# Patient Record
Sex: FEMALE | Race: WHITE | NOT HISPANIC OR LATINO | URBAN - METROPOLITAN AREA
[De-identification: names, ages, dates, MRNs, and addresses within clinical notes are randomized per-mention and may not be internally consistent; named-entity substitution may affect disease eponyms.]

---

## 2017-03-20 ENCOUNTER — NEW PATIENT (OUTPATIENT)
Dept: URBAN - METROPOLITAN AREA CLINIC 10 | Facility: CLINIC | Age: 72
End: 2017-03-20
Payer: MEDICARE

## 2017-03-20 DIAGNOSIS — H00.011 HORDEOLUM, RIGHT UPPER LID: ICD-10-CM

## 2017-03-20 PROCEDURE — 92015 DETERMINE REFRACTIVE STATE: CPT | Performed by: OPTOMETRIST

## 2017-03-20 PROCEDURE — 92004 COMPRE OPH EXAM NEW PT 1/>: CPT | Performed by: OPTOMETRIST

## 2017-03-20 PROCEDURE — 92134 CPTRZ OPH DX IMG PST SGM RTA: CPT | Performed by: OPTOMETRIST

## 2017-03-20 ASSESSMENT — KERATOMETRY
OS: 43.75
OD: 43.25

## 2017-03-20 ASSESSMENT — VISUAL ACUITY
OS: 20/20
OD: 20/80

## 2017-03-20 ASSESSMENT — INTRAOCULAR PRESSURE
OD: 20
OS: 20

## 2017-05-23 ENCOUNTER — FOLLOW UP ESTABLISHED (OUTPATIENT)
Dept: URBAN - METROPOLITAN AREA CLINIC 10 | Facility: CLINIC | Age: 72
End: 2017-05-23
Payer: MEDICARE

## 2017-05-23 PROCEDURE — 92083 EXTENDED VISUAL FIELD XM: CPT | Performed by: OPTOMETRIST

## 2017-05-23 PROCEDURE — 92133 CPTRZD OPH DX IMG PST SGM ON: CPT | Performed by: OPTOMETRIST

## 2017-05-23 PROCEDURE — 92012 INTRM OPH EXAM EST PATIENT: CPT | Performed by: OPTOMETRIST

## 2017-05-23 PROCEDURE — 76514 ECHO EXAM OF EYE THICKNESS: CPT | Performed by: OPTOMETRIST

## 2017-05-23 ASSESSMENT — INTRAOCULAR PRESSURE
OS: 21
OD: 20

## 2018-06-12 ENCOUNTER — NEW PATIENT (OUTPATIENT)
Dept: URBAN - METROPOLITAN AREA CLINIC 10 | Facility: CLINIC | Age: 73
End: 2018-06-12
Payer: MEDICARE

## 2018-06-12 DIAGNOSIS — H25.13 AGE-RELATED NUCLEAR CATARACT, BILATERAL: ICD-10-CM

## 2018-06-12 PROCEDURE — 92014 COMPRE OPH EXAM EST PT 1/>: CPT | Performed by: OPTOMETRIST

## 2018-06-12 ASSESSMENT — KERATOMETRY
OS: 43.75
OD: 44.25

## 2018-06-12 ASSESSMENT — VISUAL ACUITY
OD: 20/80
OS: 20/20

## 2018-06-12 ASSESSMENT — INTRAOCULAR PRESSURE
OD: 24
OS: 22

## 2019-07-18 ENCOUNTER — FOLLOW UP ESTABLISHED (OUTPATIENT)
Dept: URBAN - METROPOLITAN AREA CLINIC 10 | Facility: CLINIC | Age: 74
End: 2019-07-18
Payer: MEDICARE

## 2019-07-18 DIAGNOSIS — H40.013 OPEN ANGLE WITH BORDERLINE FINDINGS, LOW RISK, BILATERAL: Primary | ICD-10-CM

## 2019-07-18 DIAGNOSIS — H52.223 REGULAR ASTIGMATISM, BILATERAL: ICD-10-CM

## 2019-07-18 PROCEDURE — 92133 CPTRZD OPH DX IMG PST SGM ON: CPT | Performed by: OPTOMETRIST

## 2019-07-18 PROCEDURE — 92015 DETERMINE REFRACTIVE STATE: CPT | Performed by: OPTOMETRIST

## 2019-07-18 PROCEDURE — 92014 COMPRE OPH EXAM EST PT 1/>: CPT | Performed by: OPTOMETRIST

## 2019-07-18 PROCEDURE — 92083 EXTENDED VISUAL FIELD XM: CPT | Performed by: OPTOMETRIST

## 2019-07-18 ASSESSMENT — KERATOMETRY
OD: 43.63
OS: 43.63

## 2019-07-18 ASSESSMENT — VISUAL ACUITY
OD: 20/80
OS: 20/30

## 2019-07-18 ASSESSMENT — INTRAOCULAR PRESSURE
OS: 24
OD: 24

## 2019-09-24 ENCOUNTER — RX CHECK (OUTPATIENT)
Dept: URBAN - METROPOLITAN AREA CLINIC 10 | Facility: CLINIC | Age: 74
End: 2019-09-24
Payer: MEDICARE

## 2019-09-24 PROCEDURE — 92012 INTRM OPH EXAM EST PATIENT: CPT | Performed by: OPTOMETRIST

## 2019-09-24 ASSESSMENT — VISUAL ACUITY
OD: 20/80
OS: 20/40

## 2019-09-24 ASSESSMENT — INTRAOCULAR PRESSURE
OD: 22
OS: 22

## 2019-09-24 ASSESSMENT — KERATOMETRY
OD: 43.38
OS: 43.50

## 2019-10-02 ENCOUNTER — Encounter (OUTPATIENT)
Dept: URBAN - METROPOLITAN AREA CLINIC 10 | Facility: CLINIC | Age: 74
End: 2019-10-02
Payer: MEDICARE

## 2019-10-02 DIAGNOSIS — Z01.818 ENCOUNTER FOR OTHER PREPROCEDURAL EXAMINATION: Primary | ICD-10-CM

## 2019-10-02 PROCEDURE — 92025 CPTRIZED CORNEAL TOPOGRAPHY: CPT | Performed by: OPHTHALMOLOGY

## 2019-10-02 PROCEDURE — 99213 OFFICE O/P EST LOW 20 MIN: CPT | Performed by: PHYSICIAN ASSISTANT

## 2019-10-08 ENCOUNTER — FOLLOW UP ESTABLISHED (OUTPATIENT)
Dept: URBAN - METROPOLITAN AREA CLINIC 10 | Facility: CLINIC | Age: 74
End: 2019-10-08
Payer: MEDICARE

## 2019-10-08 DIAGNOSIS — H17.11 CENTRAL CORNEAL OPACITY, RIGHT EYE: ICD-10-CM

## 2019-10-08 DIAGNOSIS — H52.222 REGULAR ASTIGMATISM, LEFT EYE: ICD-10-CM

## 2019-10-08 DIAGNOSIS — H53.2 DIPLOPIA: ICD-10-CM

## 2019-10-08 DIAGNOSIS — H52.211 IRREGULAR ASTIGMATISM, RIGHT EYE: ICD-10-CM

## 2019-10-08 PROCEDURE — 92004 COMPRE OPH EXAM NEW PT 1/>: CPT | Performed by: OPHTHALMOLOGY

## 2019-10-08 PROCEDURE — 76519 ECHO EXAM OF EYE: CPT | Performed by: OPHTHALMOLOGY

## 2019-10-08 ASSESSMENT — INTRAOCULAR PRESSURE
OD: 23
OS: 23

## 2019-10-15 ENCOUNTER — SURGERY (OUTPATIENT)
Dept: URBAN - METROPOLITAN AREA SURGERY 5 | Facility: SURGERY | Age: 74
End: 2019-10-15
Payer: MEDICARE

## 2019-10-15 ENCOUNTER — POST OP (OUTPATIENT)
Dept: URBAN - METROPOLITAN AREA CLINIC 10 | Facility: CLINIC | Age: 74
End: 2019-10-15

## 2019-10-15 DIAGNOSIS — Z96.1 PRESENCE OF INTRAOCULAR LENS: Primary | ICD-10-CM

## 2019-10-15 PROCEDURE — 66984 XCAPSL CTRC RMVL W/O ECP: CPT | Performed by: OPHTHALMOLOGY

## 2019-10-15 PROCEDURE — 99024 POSTOP FOLLOW-UP VISIT: CPT | Performed by: OPTOMETRIST

## 2019-10-15 ASSESSMENT — INTRAOCULAR PRESSURE
OD: 42
OD: 47
OD: 9
OD: 42
OD: 9
OD: 47

## 2019-10-16 ENCOUNTER — POST OP (OUTPATIENT)
Dept: URBAN - METROPOLITAN AREA CLINIC 10 | Facility: CLINIC | Age: 74
End: 2019-10-16

## 2019-10-16 PROCEDURE — 99024 POSTOP FOLLOW-UP VISIT: CPT | Performed by: OPTOMETRIST

## 2019-10-16 RX ORDER — BRIMONIDINE TARTRATE 2 MG/ML
0.2 % SOLUTION/ DROPS OPHTHALMIC
Qty: 0 | Refills: 0 | Status: INACTIVE
Start: 2019-10-16 | End: 2019-11-20

## 2019-10-16 ASSESSMENT — INTRAOCULAR PRESSURE: OD: 21

## 2019-10-23 ENCOUNTER — POST OP (OUTPATIENT)
Dept: URBAN - METROPOLITAN AREA CLINIC 10 | Facility: CLINIC | Age: 74
End: 2019-10-23

## 2019-10-23 PROCEDURE — 99024 POSTOP FOLLOW-UP VISIT: CPT | Performed by: OPTOMETRIST

## 2019-10-23 ASSESSMENT — VISUAL ACUITY
OS: 20/40
OD: 20/100

## 2019-10-23 ASSESSMENT — INTRAOCULAR PRESSURE
OS: 24
OD: 22

## 2019-10-29 ENCOUNTER — SURGERY (OUTPATIENT)
Dept: URBAN - METROPOLITAN AREA SURGERY 5 | Facility: SURGERY | Age: 74
End: 2019-10-29
Payer: MEDICARE

## 2019-10-29 PROCEDURE — CRBAL CREDIT BALANCE: CUSTOM | Performed by: OPHTHALMOLOGY

## 2019-10-30 ENCOUNTER — POST OP (OUTPATIENT)
Dept: URBAN - METROPOLITAN AREA CLINIC 10 | Facility: CLINIC | Age: 74
End: 2019-10-30

## 2019-10-30 PROCEDURE — 99024 POSTOP FOLLOW-UP VISIT: CPT | Performed by: OPTOMETRIST

## 2019-10-30 ASSESSMENT — INTRAOCULAR PRESSURE: OS: 20

## 2019-11-20 ENCOUNTER — POST OP (OUTPATIENT)
Dept: URBAN - METROPOLITAN AREA CLINIC 10 | Facility: CLINIC | Age: 74
End: 2019-11-20

## 2019-11-20 PROCEDURE — 99024 POSTOP FOLLOW-UP VISIT: CPT | Performed by: OPTOMETRIST

## 2019-11-20 RX ORDER — BRIMONIDINE TARTRATE 2 MG/ML
0.2 % SOLUTION/ DROPS OPHTHALMIC
Qty: 1 | Refills: 3 | Status: INACTIVE
Start: 2019-11-20 | End: 2020-11-19

## 2019-11-20 ASSESSMENT — INTRAOCULAR PRESSURE
OS: 24
OS: 21
OD: 24
OD: 21

## 2019-11-20 ASSESSMENT — VISUAL ACUITY
OS: 20/25
OD: 20/100

## 2019-12-09 ENCOUNTER — RX CHECK (OUTPATIENT)
Dept: URBAN - METROPOLITAN AREA CLINIC 10 | Facility: CLINIC | Age: 74
End: 2019-12-09
Payer: MEDICARE

## 2019-12-09 DIAGNOSIS — H10.45 OTHER CHRONIC ALLERGIC CONJUNCTIVITIS: Primary | ICD-10-CM

## 2019-12-09 PROCEDURE — 92012 INTRM OPH EXAM EST PATIENT: CPT | Performed by: OPTOMETRIST

## 2019-12-09 ASSESSMENT — VISUAL ACUITY
OS: 20/20
OD: 20/100

## 2019-12-09 ASSESSMENT — KERATOMETRY
OS: 43.63
OD: 43.63

## 2020-03-10 ENCOUNTER — Encounter (OUTPATIENT)
Dept: URBAN - METROPOLITAN AREA CLINIC 10 | Facility: CLINIC | Age: 75
End: 2020-03-10
Payer: MEDICARE

## 2020-03-10 PROCEDURE — 92083 EXTENDED VISUAL FIELD XM: CPT | Performed by: OPTOMETRIST

## 2020-06-26 ENCOUNTER — FOLLOW UP ESTABLISHED (OUTPATIENT)
Dept: URBAN - METROPOLITAN AREA CLINIC 10 | Facility: CLINIC | Age: 75
End: 2020-06-26
Payer: MEDICARE

## 2020-06-26 DIAGNOSIS — H53.011 DEPRIVATION AMBLYOPIA, RIGHT EYE: ICD-10-CM

## 2020-06-26 DIAGNOSIS — H11.31 SUBCONJUNCTIVAL HEMORRHAGE OF RIGHT EYE: Primary | ICD-10-CM

## 2020-06-26 PROCEDURE — 92012 INTRM OPH EXAM EST PATIENT: CPT | Performed by: OPTOMETRIST

## 2020-06-26 ASSESSMENT — INTRAOCULAR PRESSURE
OS: 17
OD: 16

## 2020-10-27 ENCOUNTER — FOLLOW UP ESTABLISHED (OUTPATIENT)
Dept: URBAN - METROPOLITAN AREA CLINIC 10 | Facility: CLINIC | Age: 75
End: 2020-10-27
Payer: MEDICARE

## 2020-10-27 PROCEDURE — 92014 COMPRE OPH EXAM EST PT 1/>: CPT | Performed by: OPTOMETRIST

## 2020-10-27 PROCEDURE — 92133 CPTRZD OPH DX IMG PST SGM ON: CPT | Performed by: OPTOMETRIST

## 2020-10-27 ASSESSMENT — INTRAOCULAR PRESSURE
OS: 30
OS: 24
OD: 23

## 2020-10-27 ASSESSMENT — VISUAL ACUITY
OS: 20/20
OD: 20/150

## 2020-11-19 ENCOUNTER — FOLLOW UP ESTABLISHED (OUTPATIENT)
Dept: URBAN - METROPOLITAN AREA CLINIC 10 | Facility: CLINIC | Age: 75
End: 2020-11-19
Payer: MEDICARE

## 2020-11-19 PROCEDURE — 92012 INTRM OPH EXAM EST PATIENT: CPT | Performed by: OPTOMETRIST

## 2020-11-19 RX ORDER — BIMATOPROST 0.1 MG/ML
0.01 % SOLUTION/ DROPS OPHTHALMIC
Qty: 1 | Refills: 0 | Status: INACTIVE
Start: 2020-11-19 | End: 2020-11-19

## 2020-11-19 RX ORDER — LATANOPROST 50 UG/ML
0.005 % SOLUTION OPHTHALMIC
Qty: 3 | Refills: 3 | Status: INACTIVE
Start: 2020-11-19 | End: 2021-10-26

## 2020-11-19 ASSESSMENT — INTRAOCULAR PRESSURE
OD: 20
OS: 20

## 2021-03-23 ENCOUNTER — FOLLOW UP ESTABLISHED (OUTPATIENT)
Dept: URBAN - METROPOLITAN AREA CLINIC 10 | Facility: CLINIC | Age: 76
End: 2021-03-23
Payer: OTHER MISCELLANEOUS

## 2021-03-23 PROCEDURE — 99213 OFFICE O/P EST LOW 20 MIN: CPT | Performed by: OPTOMETRIST

## 2021-03-23 ASSESSMENT — INTRAOCULAR PRESSURE
OD: 19
OS: 21

## 2021-10-26 ENCOUNTER — OFFICE VISIT (OUTPATIENT)
Dept: URBAN - METROPOLITAN AREA CLINIC 10 | Facility: CLINIC | Age: 76
End: 2021-10-26
Payer: OTHER MISCELLANEOUS

## 2021-10-26 DIAGNOSIS — H26.493 OTHER SECONDARY CATARACT, BILATERAL: ICD-10-CM

## 2021-10-26 DIAGNOSIS — H43.811 VITREOUS DEGENERATION, RIGHT EYE: ICD-10-CM

## 2021-10-26 DIAGNOSIS — H05.20 UNSPECIFIED EXOPHTHALMOS: ICD-10-CM

## 2021-10-26 PROCEDURE — 99214 OFFICE O/P EST MOD 30 MIN: CPT | Performed by: OPTOMETRIST

## 2021-10-26 RX ORDER — LATANOPROST 50 UG/ML
0.005 % SOLUTION OPHTHALMIC
Qty: 7.5 | Refills: 3 | Status: INACTIVE
Start: 2021-10-26 | End: 2021-12-07

## 2021-10-26 ASSESSMENT — VISUAL ACUITY
OS: 20/20
OD: 20/150

## 2021-10-26 ASSESSMENT — INTRAOCULAR PRESSURE
OD: 20
OS: 20

## 2021-10-26 NOTE — IMPRESSION/PLAN
Impression: Unspecified exophthalmos Plan: Pt does not report change. Prior CT of head and orbits unremarkable although right globe noted to be larger than left. Prior thyroid panel normal.
Mild exophthalmos OD. No pain on retropulsion or restriction on EOMs. BCVA is limited by amblyopia and appears stable. Monitor.

## 2021-10-26 NOTE — IMPRESSION/PLAN
Impression: Open angle with borderline findings, low risk, bilateral
--Denies fam hx
--Previously on treatment and taken off, notes received ([facility]). --Moderate IOPs, moderate c/d but stable to notes and with healthy rim tissue
--? Previous SLT, notes difficult to read --Thin pachs OD due to previous scar removal, normal OS Plan: Discs and IOPs remain stable. Photos today appear stable. Last HVF essentially clean OD, scattered inferior defects OS. Last RNFL: Superior/Inferior thinning OU early temp thin OS. Continue Latanoprost qhs OU. RTC 6 mo IOP check c possible HVF 24-2 and OCT RNFL.

## 2021-10-26 NOTE — IMPRESSION/PLAN
Impression: Other secondary cataract, bilateral: H26.493. Plan: Opacified capsule not affecting vision. Continue to monitor.
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
## 2022-05-09 ENCOUNTER — OFFICE VISIT (OUTPATIENT)
Dept: URBAN - METROPOLITAN AREA CLINIC 10 | Facility: CLINIC | Age: 77
End: 2022-05-09
Payer: OTHER MISCELLANEOUS

## 2022-05-09 DIAGNOSIS — H05.20 UNSPECIFIED EXOPHTHALMOS: ICD-10-CM

## 2022-05-09 DIAGNOSIS — H40.013 OPEN ANGLE WITH BORDERLINE FINDINGS, LOW RISK, BILATERAL: Primary | ICD-10-CM

## 2022-05-09 PROCEDURE — 92083 EXTENDED VISUAL FIELD XM: CPT | Performed by: OPTOMETRIST

## 2022-05-09 PROCEDURE — 99214 OFFICE O/P EST MOD 30 MIN: CPT | Performed by: OPTOMETRIST

## 2022-05-09 RX ORDER — LATANOPROST 50 UG/ML
0.005 % SOLUTION OPHTHALMIC
Qty: 7.5 | Refills: 3 | Status: ACTIVE
Start: 2022-05-09

## 2022-05-09 ASSESSMENT — INTRAOCULAR PRESSURE
OS: 19
OD: 17

## 2022-05-09 NOTE — IMPRESSION/PLAN
Impression: Open angle with borderline findings, low risk, bilateral
--Denies fam hx
--Previously on treatment and taken off, notes received ([facility]). --Moderate IOPs, moderate c/d but stable to notes and with healthy rim tissue
--? Previous SLT, notes difficult to read --Thin pachs OD due to previous scar removal, normal OS Plan: Discs and IOPs remain stable. HVF essentially clean OD, scattered inferior defects OS. No apparent progression. Last RNFL: Superior/Inferior thinning OU early temp thin OS. Continue Latanoprost qhs OU. RTC 6 mo CEE and possible OCT RNFL.

## 2022-11-10 ENCOUNTER — OFFICE VISIT (OUTPATIENT)
Dept: URBAN - METROPOLITAN AREA CLINIC 10 | Facility: CLINIC | Age: 77
End: 2022-11-10
Payer: OTHER MISCELLANEOUS

## 2022-11-10 DIAGNOSIS — H40.013 OPEN ANGLE WITH BORDERLINE FINDINGS, LOW RISK, BILATERAL: Primary | ICD-10-CM

## 2022-11-10 DIAGNOSIS — H05.20 UNSPECIFIED EXOPHTHALMOS: ICD-10-CM

## 2022-11-10 DIAGNOSIS — H53.011 DEPRIVATION AMBLYOPIA, RIGHT EYE: ICD-10-CM

## 2022-11-10 DIAGNOSIS — H26.493 OTHER SECONDARY CATARACT, BILATERAL: ICD-10-CM

## 2022-11-10 DIAGNOSIS — H43.811 VITREOUS DEGENERATION, RIGHT EYE: ICD-10-CM

## 2022-11-10 PROCEDURE — 92133 CPTRZD OPH DX IMG PST SGM ON: CPT | Performed by: STUDENT IN AN ORGANIZED HEALTH CARE EDUCATION/TRAINING PROGRAM

## 2022-11-10 PROCEDURE — 99214 OFFICE O/P EST MOD 30 MIN: CPT | Performed by: STUDENT IN AN ORGANIZED HEALTH CARE EDUCATION/TRAINING PROGRAM

## 2022-11-10 ASSESSMENT — VISUAL ACUITY
OD: 20/70
OS: 20/20

## 2022-11-10 ASSESSMENT — INTRAOCULAR PRESSURE
OD: 19
OS: 22

## 2022-11-10 ASSESSMENT — KERATOMETRY
OD: 43.75
OS: 43.50

## 2022-11-10 NOTE — IMPRESSION/PLAN
Impression: Vitreous degeneration, right eye: H43.811. Plan: Patient educated on condition, updated and released SRx. Monitor.

## 2022-11-10 NOTE — IMPRESSION/PLAN
Impression: Open angle with borderline findings, low risk, bilateral
--Denies fam hx
--Previously on treatment and taken off, notes received ([facility]). --Moderate IOPs, moderate c/d but stable to notes and with healthy rim tissue
--? Previous SLT, notes difficult to read --Thin pachs OD due to previous scar removal, normal OS Plan: Discs and IOPs remain stable. IOP's slight elevated OS
HVF essentially clean OD, scattered inferior defects OS. No apparent progression. Last RNFL: Superior/Inferior thinning OU ONL temporal thin OS. (possible mild progression OS) Continue Latanoprost qhs OU. 

RTC 4mo for IOP and 24-2HVF

## 2023-03-20 ENCOUNTER — OFFICE VISIT (OUTPATIENT)
Dept: URBAN - METROPOLITAN AREA CLINIC 10 | Facility: CLINIC | Age: 78
End: 2023-03-20
Payer: OTHER MISCELLANEOUS

## 2023-03-20 DIAGNOSIS — H53.011 DEPRIVATION AMBLYOPIA, RIGHT EYE: ICD-10-CM

## 2023-03-20 DIAGNOSIS — H05.20 UNSPECIFIED EXOPHTHALMOS: ICD-10-CM

## 2023-03-20 DIAGNOSIS — H26.493 OTHER SECONDARY CATARACT, BILATERAL: ICD-10-CM

## 2023-03-20 DIAGNOSIS — H40.013 OPEN ANGLE WITH BORDERLINE FINDINGS, LOW RISK, BILATERAL: Primary | ICD-10-CM

## 2023-03-20 PROCEDURE — 99214 OFFICE O/P EST MOD 30 MIN: CPT | Performed by: STUDENT IN AN ORGANIZED HEALTH CARE EDUCATION/TRAINING PROGRAM

## 2023-03-20 PROCEDURE — 92083 EXTENDED VISUAL FIELD XM: CPT | Performed by: STUDENT IN AN ORGANIZED HEALTH CARE EDUCATION/TRAINING PROGRAM

## 2023-03-20 ASSESSMENT — INTRAOCULAR PRESSURE
OD: 16
OS: 18

## 2023-03-20 NOTE — IMPRESSION/PLAN
Impression: Open angle with borderline findings, low risk, bilateral
--Denies fam hx
--Previously on treatment and taken off, notes received ([facility]). --Moderate IOPs, moderate c/d but stable to notes and with healthy rim tissue
--? Previous SLT, notes difficult to read --Thin pachs OD due to previous scar removal, normal OS Plan: Discussed findings, 
IOP today 16/18 Discs and IOPs remain stable. HVF essentially clean OD, scattered inferior defects OS. (repeatable) Last RNFL: Superior/Inferior thinning OU ONL temporal thin OS. (possible mild progression OS) Continue Latanoprost qhs OU. 

RTC 6mo for CE and 24-2HVF RNFL and PACHS

## 2023-09-18 ENCOUNTER — OFFICE VISIT (OUTPATIENT)
Dept: URBAN - METROPOLITAN AREA CLINIC 10 | Facility: CLINIC | Age: 78
End: 2023-09-18
Payer: MEDICARE

## 2023-09-18 DIAGNOSIS — H40.013 OPEN ANGLE WITH BORDERLINE FINDINGS, LOW RISK, BILATERAL: Primary | ICD-10-CM

## 2023-09-18 DIAGNOSIS — H05.20 UNSPECIFIED EXOPHTHALMOS: ICD-10-CM

## 2023-09-18 DIAGNOSIS — H26.493 OTHER SECONDARY CATARACT, BILATERAL: ICD-10-CM

## 2023-09-18 DIAGNOSIS — H53.011 DEPRIVATION AMBLYOPIA, RIGHT EYE: ICD-10-CM

## 2023-09-18 PROCEDURE — 99214 OFFICE O/P EST MOD 30 MIN: CPT | Performed by: STUDENT IN AN ORGANIZED HEALTH CARE EDUCATION/TRAINING PROGRAM

## 2023-09-18 PROCEDURE — 92133 CPTRZD OPH DX IMG PST SGM ON: CPT | Performed by: STUDENT IN AN ORGANIZED HEALTH CARE EDUCATION/TRAINING PROGRAM

## 2023-09-18 PROCEDURE — 76514 ECHO EXAM OF EYE THICKNESS: CPT | Performed by: STUDENT IN AN ORGANIZED HEALTH CARE EDUCATION/TRAINING PROGRAM

## 2023-09-18 PROCEDURE — 92083 EXTENDED VISUAL FIELD XM: CPT | Performed by: STUDENT IN AN ORGANIZED HEALTH CARE EDUCATION/TRAINING PROGRAM

## 2023-09-18 RX ORDER — LATANOPROST 50 UG/ML
0.005 % SOLUTION OPHTHALMIC
Qty: 7.5 | Refills: 3 | Status: ACTIVE
Start: 2023-09-18

## 2023-09-18 ASSESSMENT — INTRAOCULAR PRESSURE
OD: 16
OS: 16

## 2023-09-18 ASSESSMENT — VISUAL ACUITY
OD: 20/60
OS: 20/20

## 2024-03-18 ENCOUNTER — OFFICE VISIT (OUTPATIENT)
Dept: URBAN - METROPOLITAN AREA CLINIC 10 | Facility: CLINIC | Age: 79
End: 2024-03-18
Payer: MEDICARE

## 2024-03-18 DIAGNOSIS — H26.493 OTHER SECONDARY CATARACT, BILATERAL: ICD-10-CM

## 2024-03-18 DIAGNOSIS — H53.011 DEPRIVATION AMBLYOPIA, RIGHT EYE: ICD-10-CM

## 2024-03-18 DIAGNOSIS — H40.013 OPEN ANGLE WITH BORDERLINE FINDINGS, LOW RISK, BILATERAL: Primary | ICD-10-CM

## 2024-03-18 DIAGNOSIS — H43.313 VITREOUS MEMBRANES AND STRANDS, BILATERAL: ICD-10-CM

## 2024-03-18 DIAGNOSIS — H05.20 UNSPECIFIED EXOPHTHALMOS: ICD-10-CM

## 2024-03-18 PROCEDURE — 92083 EXTENDED VISUAL FIELD XM: CPT | Performed by: STUDENT IN AN ORGANIZED HEALTH CARE EDUCATION/TRAINING PROGRAM

## 2024-03-18 PROCEDURE — 76514 ECHO EXAM OF EYE THICKNESS: CPT | Performed by: STUDENT IN AN ORGANIZED HEALTH CARE EDUCATION/TRAINING PROGRAM

## 2024-03-18 PROCEDURE — 92133 CPTRZD OPH DX IMG PST SGM ON: CPT | Performed by: STUDENT IN AN ORGANIZED HEALTH CARE EDUCATION/TRAINING PROGRAM

## 2024-03-18 PROCEDURE — 99214 OFFICE O/P EST MOD 30 MIN: CPT | Performed by: STUDENT IN AN ORGANIZED HEALTH CARE EDUCATION/TRAINING PROGRAM

## 2024-03-18 ASSESSMENT — VISUAL ACUITY
OS: 20/20
OD: 20/80

## 2024-03-18 ASSESSMENT — INTRAOCULAR PRESSURE
OS: 17
OD: 17

## 2024-11-12 ENCOUNTER — OFFICE VISIT (OUTPATIENT)
Dept: URBAN - METROPOLITAN AREA CLINIC 10 | Facility: CLINIC | Age: 79
End: 2024-11-12
Payer: MEDICARE

## 2024-11-12 DIAGNOSIS — H05.20 UNSPECIFIED EXOPHTHALMOS: ICD-10-CM

## 2024-11-12 DIAGNOSIS — H04.123 DRY EYE SYNDROME OF BILATERAL LACRIMAL GLANDS: ICD-10-CM

## 2024-11-12 DIAGNOSIS — H40.013 OPEN ANGLE WITH BORDERLINE FINDINGS, LOW RISK, BILATERAL: Primary | ICD-10-CM

## 2024-11-12 DIAGNOSIS — H53.011 DEPRIVATION AMBLYOPIA, RIGHT EYE: ICD-10-CM

## 2024-11-12 DIAGNOSIS — H26.493 OTHER SECONDARY CATARACT, BILATERAL: ICD-10-CM

## 2024-11-12 PROCEDURE — 92083 EXTENDED VISUAL FIELD XM: CPT | Performed by: STUDENT IN AN ORGANIZED HEALTH CARE EDUCATION/TRAINING PROGRAM

## 2024-11-12 PROCEDURE — 99214 OFFICE O/P EST MOD 30 MIN: CPT | Performed by: STUDENT IN AN ORGANIZED HEALTH CARE EDUCATION/TRAINING PROGRAM

## 2024-11-12 RX ORDER — ERYTHROMYCIN 5 MG/G
OINTMENT OPHTHALMIC
Qty: 3.5 | Refills: 1 | Status: ACTIVE
Start: 2024-11-12

## 2024-11-12 ASSESSMENT — INTRAOCULAR PRESSURE
OD: 14
OS: 14

## 2025-01-03 ENCOUNTER — OFFICE VISIT (OUTPATIENT)
Dept: URBAN - METROPOLITAN AREA CLINIC 76 | Facility: CLINIC | Age: 80
End: 2025-01-03
Payer: MEDICARE

## 2025-01-03 DIAGNOSIS — H04.123 DRY EYE SYNDROME OF BILATERAL LACRIMAL GLANDS: ICD-10-CM

## 2025-01-03 DIAGNOSIS — H05.20 UNSPECIFIED EXOPHTHALMOS: ICD-10-CM

## 2025-01-03 DIAGNOSIS — H52.4 PRESBYOPIA: ICD-10-CM

## 2025-01-03 DIAGNOSIS — H53.011 DEPRIVATION AMBLYOPIA, RIGHT EYE: ICD-10-CM

## 2025-01-03 DIAGNOSIS — H26.493 OTHER SECONDARY CATARACT, BILATERAL: Primary | ICD-10-CM

## 2025-01-03 DIAGNOSIS — H40.013 OPEN ANGLE WITH BORDERLINE FINDINGS, LOW RISK, BILATERAL: ICD-10-CM

## 2025-01-03 PROCEDURE — 92133 CPTRZD OPH DX IMG PST SGM ON: CPT | Performed by: OPTOMETRIST

## 2025-01-03 PROCEDURE — 99204 OFFICE O/P NEW MOD 45 MIN: CPT | Performed by: OPTOMETRIST

## 2025-01-03 PROCEDURE — 92015 DETERMINE REFRACTIVE STATE: CPT | Performed by: OPTOMETRIST

## 2025-01-03 ASSESSMENT — VISUAL ACUITY
OS: 20/20
OD: 20/200

## 2025-01-03 ASSESSMENT — INTRAOCULAR PRESSURE
OS: 16
OD: 16

## 2025-01-03 ASSESSMENT — KERATOMETRY
OS: 43.88
OD: 42.00

## 2025-01-13 ENCOUNTER — OFFICE VISIT (OUTPATIENT)
Dept: URBAN - METROPOLITAN AREA CLINIC 76 | Facility: CLINIC | Age: 80
End: 2025-01-13
Payer: MEDICARE

## 2025-01-13 DIAGNOSIS — H40.013 OPEN ANGLE WITH BORDERLINE FINDINGS, LOW RISK, BILATERAL: ICD-10-CM

## 2025-01-13 DIAGNOSIS — H53.011 DEPRIVATION AMBLYOPIA, RIGHT EYE: ICD-10-CM

## 2025-01-13 DIAGNOSIS — H52.4 PRESBYOPIA: ICD-10-CM

## 2025-01-13 DIAGNOSIS — H26.493 OTHER SECONDARY CATARACT, BILATERAL: Primary | ICD-10-CM

## 2025-01-13 DIAGNOSIS — Z96.1 PRESENCE OF INTRAOCULAR LENS: ICD-10-CM

## 2025-01-13 PROCEDURE — 99204 OFFICE O/P NEW MOD 45 MIN: CPT | Performed by: OPHTHALMOLOGY

## 2025-01-13 PROCEDURE — 92015 DETERMINE REFRACTIVE STATE: CPT | Performed by: OPHTHALMOLOGY

## 2025-01-13 ASSESSMENT — VISUAL ACUITY
OD: 20/150
OS: 20/20

## 2025-01-13 ASSESSMENT — INTRAOCULAR PRESSURE
OS: 15
OD: 16

## 2025-01-13 ASSESSMENT — KERATOMETRY
OS: 43.38
OD: 42.13

## 2025-02-25 ENCOUNTER — SURGERY (OUTPATIENT)
Dept: URBAN - METROPOLITAN AREA SURGERY 47 | Facility: SURGERY | Age: 80
End: 2025-02-25
Payer: MEDICARE

## 2025-02-25 PROCEDURE — 66821 AFTER CATARACT LASER SURGERY: CPT | Performed by: OPHTHALMOLOGY

## 2025-03-04 ENCOUNTER — POST-OPERATIVE VISIT (OUTPATIENT)
Dept: URBAN - METROPOLITAN AREA CLINIC 76 | Facility: CLINIC | Age: 80
End: 2025-03-04
Payer: MEDICARE

## 2025-03-04 DIAGNOSIS — Z48.810 ENCOUNTER FOR SURGICAL AFTERCARE FOLLOWING SURGERY ON A SENSE ORGAN: ICD-10-CM

## 2025-03-04 DIAGNOSIS — H52.4 PRESBYOPIA: Primary | ICD-10-CM

## 2025-03-04 ASSESSMENT — VISUAL ACUITY
OD: 20/150
OS: 20/20

## 2025-03-04 ASSESSMENT — INTRAOCULAR PRESSURE
OS: 12
OD: 12

## 2025-04-16 ENCOUNTER — OFFICE VISIT (OUTPATIENT)
Dept: URBAN - METROPOLITAN AREA CLINIC 76 | Facility: CLINIC | Age: 80
End: 2025-04-16
Payer: COMMERCIAL

## 2025-04-16 DIAGNOSIS — H52.4 PRESBYOPIA: Primary | ICD-10-CM

## 2025-04-16 PROCEDURE — 92014 COMPRE OPH EXAM EST PT 1/>: CPT | Performed by: OPTOMETRIST

## 2025-04-16 ASSESSMENT — KERATOMETRY
OS: 44.13
OD: 43.88

## 2025-04-16 ASSESSMENT — INTRAOCULAR PRESSURE
OS: 17
OD: 17

## 2025-04-16 ASSESSMENT — VISUAL ACUITY
OS: 20/20
OD: 20/200

## 2025-05-14 ENCOUNTER — OFFICE VISIT (OUTPATIENT)
Dept: URBAN - METROPOLITAN AREA CLINIC 76 | Facility: CLINIC | Age: 80
End: 2025-05-14
Payer: MEDICARE

## 2025-05-14 DIAGNOSIS — H40.013 OPEN ANGLE WITH BORDERLINE FINDINGS, LOW RISK, BILATERAL: Primary | ICD-10-CM

## 2025-05-14 DIAGNOSIS — H53.011 DEPRIVATION AMBLYOPIA, RIGHT EYE: ICD-10-CM

## 2025-05-14 PROCEDURE — 99213 OFFICE O/P EST LOW 20 MIN: CPT | Performed by: OPTOMETRIST

## 2025-05-14 PROCEDURE — 92133 CPTRZD OPH DX IMG PST SGM ON: CPT | Performed by: OPTOMETRIST

## 2025-05-14 ASSESSMENT — INTRAOCULAR PRESSURE
OS: 16
OD: 16